# Patient Record
Sex: FEMALE | Race: WHITE | NOT HISPANIC OR LATINO | Employment: OTHER | URBAN - METROPOLITAN AREA
[De-identification: names, ages, dates, MRNs, and addresses within clinical notes are randomized per-mention and may not be internally consistent; named-entity substitution may affect disease eponyms.]

---

## 2018-10-14 ENCOUNTER — APPOINTMENT (OUTPATIENT)
Dept: CT IMAGING | Facility: HOSPITAL | Age: 53
End: 2018-10-14

## 2018-10-14 ENCOUNTER — HOSPITAL ENCOUNTER (EMERGENCY)
Facility: HOSPITAL | Age: 53
Discharge: HOME | End: 2018-10-14
Attending: EMERGENCY MEDICINE | Admitting: EMERGENCY MEDICINE

## 2018-10-14 VITALS
WEIGHT: 250 LBS | RESPIRATION RATE: 16 BRPM | HEART RATE: 98 BPM | HEIGHT: 68 IN | OXYGEN SATURATION: 99 % | BODY MASS INDEX: 37.89 KG/M2 | SYSTOLIC BLOOD PRESSURE: 98 MMHG | TEMPERATURE: 98.8 F | DIASTOLIC BLOOD PRESSURE: 54 MMHG

## 2018-10-14 DIAGNOSIS — R51.9 NONINTRACTABLE HEADACHE, UNSPECIFIED CHRONICITY PATTERN, UNSPECIFIED HEADACHE TYPE: Primary | ICD-10-CM

## 2018-10-14 DIAGNOSIS — R19.7 DIARRHEA, UNSPECIFIED TYPE: ICD-10-CM

## 2018-10-14 LAB
ALBUMIN SERPL-MCNC: 4.24 G/DL (ref 3.2–4.8)
ALBUMIN/GLOB SERPL: 2 G/DL (ref 1.5–2.5)
ALP SERPL-CCNC: 110 U/L (ref 25–100)
ALT SERPL W P-5'-P-CCNC: 16 U/L (ref 7–40)
ANION GAP SERPL CALCULATED.3IONS-SCNC: 6 MMOL/L (ref 3–11)
AST SERPL-CCNC: 22 U/L (ref 0–33)
BACTERIA UR QL AUTO: ABNORMAL /HPF
BASOPHILS # BLD AUTO: 0.01 10*3/MM3 (ref 0–0.2)
BASOPHILS NFR BLD AUTO: 0.2 % (ref 0–1)
BILIRUB SERPL-MCNC: 0.6 MG/DL (ref 0.3–1.2)
BILIRUB UR QL STRIP: NEGATIVE
BUN BLD-MCNC: 16 MG/DL (ref 9–23)
BUN/CREAT SERPL: 17.2 (ref 7–25)
CALCIUM SPEC-SCNC: 9 MG/DL (ref 8.7–10.4)
CHLORIDE SERPL-SCNC: 104 MMOL/L (ref 99–109)
CLARITY UR: ABNORMAL
CO2 SERPL-SCNC: 26 MMOL/L (ref 20–31)
COLOR UR: YELLOW
CREAT BLD-MCNC: 0.93 MG/DL (ref 0.6–1.3)
D-LACTATE SERPL-SCNC: 1.2 MMOL/L (ref 0.5–2)
DEPRECATED RDW RBC AUTO: 40.1 FL (ref 37–54)
EOSINOPHIL # BLD AUTO: 0.03 10*3/MM3 (ref 0–0.3)
EOSINOPHIL NFR BLD AUTO: 0.5 % (ref 0–3)
ERYTHROCYTE [DISTWIDTH] IN BLOOD BY AUTOMATED COUNT: 13.3 % (ref 11.3–14.5)
FLUAV AG NPH QL: NEGATIVE
FLUBV AG NPH QL IA: NEGATIVE
GFR SERPL CREATININE-BSD FRML MDRD: 63 ML/MIN/1.73
GLOBULIN UR ELPH-MCNC: 2.2 GM/DL
GLUCOSE BLD-MCNC: 134 MG/DL (ref 70–100)
GLUCOSE UR STRIP-MCNC: NEGATIVE MG/DL
HCT VFR BLD AUTO: 41.5 % (ref 34.5–44)
HGB BLD-MCNC: 13.7 G/DL (ref 11.5–15.5)
HGB UR QL STRIP.AUTO: NEGATIVE
HOLD SPECIMEN: NORMAL
HOLD SPECIMEN: NORMAL
HYALINE CASTS UR QL AUTO: ABNORMAL /LPF
IMM GRANULOCYTES # BLD: 0.04 10*3/MM3 (ref 0–0.03)
IMM GRANULOCYTES NFR BLD: 0.6 % (ref 0–0.6)
KETONES UR QL STRIP: ABNORMAL
LEUKOCYTE ESTERASE UR QL STRIP.AUTO: NEGATIVE
LIPASE SERPL-CCNC: 22 U/L (ref 6–51)
LYMPHOCYTES # BLD AUTO: 0.49 10*3/MM3 (ref 0.6–4.8)
LYMPHOCYTES NFR BLD AUTO: 7.6 % (ref 24–44)
MCH RBC QN AUTO: 27.7 PG (ref 27–31)
MCHC RBC AUTO-ENTMCNC: 33 G/DL (ref 32–36)
MCV RBC AUTO: 83.8 FL (ref 80–99)
MONOCYTES # BLD AUTO: 0.28 10*3/MM3 (ref 0–1)
MONOCYTES NFR BLD AUTO: 4.3 % (ref 0–12)
NEUTROPHILS # BLD AUTO: 5.68 10*3/MM3 (ref 1.5–8.3)
NEUTROPHILS NFR BLD AUTO: 87.4 % (ref 41–71)
NITRITE UR QL STRIP: NEGATIVE
PH UR STRIP.AUTO: 5.5 [PH] (ref 5–8)
PLATELET # BLD AUTO: 200 10*3/MM3 (ref 150–450)
PMV BLD AUTO: 10.3 FL (ref 6–12)
POTASSIUM BLD-SCNC: 3 MMOL/L (ref 3.5–5.5)
PROT SERPL-MCNC: 6.4 G/DL (ref 5.7–8.2)
PROT UR QL STRIP: ABNORMAL
RBC # BLD AUTO: 4.95 10*6/MM3 (ref 3.89–5.14)
RBC # UR: ABNORMAL /HPF
REF LAB TEST METHOD: ABNORMAL
S PYO AG THROAT QL: NEGATIVE
SODIUM BLD-SCNC: 136 MMOL/L (ref 132–146)
SP GR UR STRIP: 1.03 (ref 1–1.03)
SQUAMOUS #/AREA URNS HPF: ABNORMAL /HPF
UROBILINOGEN UR QL STRIP: ABNORMAL
WBC NRBC COR # BLD: 6.49 10*3/MM3 (ref 3.5–10.8)
WBC UR QL AUTO: ABNORMAL /HPF
WHOLE BLOOD HOLD SPECIMEN: NORMAL
WHOLE BLOOD HOLD SPECIMEN: NORMAL

## 2018-10-14 PROCEDURE — 80053 COMPREHEN METABOLIC PANEL: CPT | Performed by: EMERGENCY MEDICINE

## 2018-10-14 PROCEDURE — 87081 CULTURE SCREEN ONLY: CPT | Performed by: EMERGENCY MEDICINE

## 2018-10-14 PROCEDURE — 83605 ASSAY OF LACTIC ACID: CPT | Performed by: EMERGENCY MEDICINE

## 2018-10-14 PROCEDURE — 25010000002 ONDANSETRON PER 1 MG: Performed by: EMERGENCY MEDICINE

## 2018-10-14 PROCEDURE — 85025 COMPLETE CBC W/AUTO DIFF WBC: CPT | Performed by: EMERGENCY MEDICINE

## 2018-10-14 PROCEDURE — 99285 EMERGENCY DEPT VISIT HI MDM: CPT

## 2018-10-14 PROCEDURE — 87804 INFLUENZA ASSAY W/OPTIC: CPT | Performed by: EMERGENCY MEDICINE

## 2018-10-14 PROCEDURE — 70450 CT HEAD/BRAIN W/O DYE: CPT

## 2018-10-14 PROCEDURE — 96375 TX/PRO/DX INJ NEW DRUG ADDON: CPT

## 2018-10-14 PROCEDURE — 25010000002 KETOROLAC TROMETHAMINE PER 15 MG: Performed by: EMERGENCY MEDICINE

## 2018-10-14 PROCEDURE — 25010000002 METOCLOPRAMIDE PER 10 MG: Performed by: EMERGENCY MEDICINE

## 2018-10-14 PROCEDURE — 87040 BLOOD CULTURE FOR BACTERIA: CPT | Performed by: EMERGENCY MEDICINE

## 2018-10-14 PROCEDURE — 96365 THER/PROPH/DIAG IV INF INIT: CPT

## 2018-10-14 PROCEDURE — 25010000002 DIPHENHYDRAMINE PER 50 MG: Performed by: EMERGENCY MEDICINE

## 2018-10-14 PROCEDURE — 87147 CULTURE TYPE IMMUNOLOGIC: CPT | Performed by: EMERGENCY MEDICINE

## 2018-10-14 PROCEDURE — 87880 STREP A ASSAY W/OPTIC: CPT | Performed by: EMERGENCY MEDICINE

## 2018-10-14 PROCEDURE — 81001 URINALYSIS AUTO W/SCOPE: CPT | Performed by: EMERGENCY MEDICINE

## 2018-10-14 PROCEDURE — 25010000002 DEXAMETHASONE: Performed by: EMERGENCY MEDICINE

## 2018-10-14 PROCEDURE — 83690 ASSAY OF LIPASE: CPT | Performed by: EMERGENCY MEDICINE

## 2018-10-14 PROCEDURE — 96361 HYDRATE IV INFUSION ADD-ON: CPT

## 2018-10-14 RX ORDER — NIFEDIPINE 10 MG/1
CAPSULE ORAL DAILY
COMMUNITY

## 2018-10-14 RX ORDER — PREGABALIN 100 MG/1
100 CAPSULE ORAL 3 TIMES DAILY
COMMUNITY

## 2018-10-14 RX ORDER — KETOROLAC TROMETHAMINE 15 MG/ML
15 INJECTION, SOLUTION INTRAMUSCULAR; INTRAVENOUS ONCE
Status: COMPLETED | OUTPATIENT
Start: 2018-10-14 | End: 2018-10-14

## 2018-10-14 RX ORDER — ONDANSETRON 8 MG/1
8 TABLET, ORALLY DISINTEGRATING ORAL EVERY 8 HOURS PRN
Qty: 12 TABLET | Refills: 0 | Status: SHIPPED | OUTPATIENT
Start: 2018-10-14

## 2018-10-14 RX ORDER — HYDROCHLOROTHIAZIDE 12.5 MG/1
CAPSULE, GELATIN COATED ORAL DAILY
COMMUNITY

## 2018-10-14 RX ORDER — LEVOTHYROXINE SODIUM 0.15 MG/1
150 TABLET ORAL DAILY
COMMUNITY
End: 2021-10-12

## 2018-10-14 RX ORDER — DULOXETIN HYDROCHLORIDE 60 MG/1
100 CAPSULE, DELAYED RELEASE ORAL DAILY
COMMUNITY

## 2018-10-14 RX ORDER — DIPHENHYDRAMINE HYDROCHLORIDE 50 MG/ML
25 INJECTION INTRAMUSCULAR; INTRAVENOUS ONCE
Status: COMPLETED | OUTPATIENT
Start: 2018-10-14 | End: 2018-10-14

## 2018-10-14 RX ORDER — ONDANSETRON 2 MG/ML
4 INJECTION INTRAMUSCULAR; INTRAVENOUS ONCE
Status: COMPLETED | OUTPATIENT
Start: 2018-10-14 | End: 2018-10-14

## 2018-10-14 RX ORDER — METOCLOPRAMIDE HYDROCHLORIDE 5 MG/ML
10 INJECTION INTRAMUSCULAR; INTRAVENOUS ONCE
Status: DISCONTINUED | OUTPATIENT
Start: 2018-10-14 | End: 2018-10-14 | Stop reason: SDUPTHER

## 2018-10-14 RX ORDER — SODIUM CHLORIDE 0.9 % (FLUSH) 0.9 %
10 SYRINGE (ML) INJECTION AS NEEDED
Status: DISCONTINUED | OUTPATIENT
Start: 2018-10-14 | End: 2018-10-14 | Stop reason: HOSPADM

## 2018-10-14 RX ADMIN — ONDANSETRON HYDROCHLORIDE 4 MG: 2 INJECTION, SOLUTION INTRAMUSCULAR; INTRAVENOUS at 16:13

## 2018-10-14 RX ADMIN — SODIUM CHLORIDE 1000 ML: 9 INJECTION, SOLUTION INTRAVENOUS at 19:44

## 2018-10-14 RX ADMIN — SODIUM CHLORIDE 1000 ML: 9 INJECTION, SOLUTION INTRAVENOUS at 16:11

## 2018-10-14 RX ADMIN — KETOROLAC TROMETHAMINE 15 MG: 15 INJECTION, SOLUTION INTRAMUSCULAR; INTRAVENOUS at 18:53

## 2018-10-14 RX ADMIN — DIPHENHYDRAMINE HYDROCHLORIDE 25 MG: 50 INJECTION, SOLUTION INTRAMUSCULAR; INTRAVENOUS at 16:13

## 2018-10-14 RX ADMIN — METOCLOPRAMIDE: 5 INJECTION, SOLUTION INTRAMUSCULAR; INTRAVENOUS at 17:26

## 2018-10-14 RX ADMIN — DEXAMETHASONE SODIUM PHOSPHATE 10 MG: 10 INJECTION INTRAMUSCULAR; INTRAVENOUS at 16:42

## 2018-10-14 NOTE — ED PROVIDER NOTES
Subjective   Amanda Tejeda is a 53 y.o.female who presents to the ED with complaints of a headache. The patient reports her headache developed last night around  and has been worsening since onset. She states her headache is located in the occipital region of her head. She currently rates her discomfort as an 8/10. She has not taken any medication. She also complains of nausea, vomiting, diarrhea, fever, abdominal pain, and chills. She denies any recent use of antibiotics or travel. Additionally, she denies any history of DVT. There are no other complaints at this time.         History provided by:  Patient  Headache   Pain location:  Occipital  Quality:  Sharp  Radiates to:  Does not radiate  Severity currently:  810  Onset quality:  Sudden  Duration:  1 day  Timing:  Constant  Progression:  Worsening  Chronicity:  New  Similar to prior headaches: no    Relieved by:  None tried  Worsened by:  Nothing  Ineffective treatments:  None tried  Associated symptoms: abdominal pain, diarrhea, fever, nausea and vomiting        Review of Systems   Constitutional: Positive for chills and fever.   Gastrointestinal: Positive for abdominal pain, diarrhea, nausea and vomiting.   Neurological: Positive for headaches.   All other systems reviewed and are negative.      History reviewed. No pertinent past medical history.    Allergies   Allergen Reactions   • Codeine Nausea And Vomiting   • Penicillins Rash       Past Surgical History:   Procedure Laterality Date   •  SECTION     • HERNIA REPAIR     • HYSTERECTOMY     • NECK SURGERY         History reviewed. No pertinent family history.    Social History     Social History   • Marital status:      Social History Main Topics   • Smoking status: Never Smoker   • Smokeless tobacco: Never Used   • Alcohol use Yes      Comment: social   • Drug use: No   • Sexual activity: Defer     Other Topics Concern   • Not on file         Objective   Physical Exam   Constitutional:  She is oriented to person, place, and time. She appears well-developed and well-nourished. No distress.   HENT:   Head: Normocephalic and atraumatic.   Nose: Nose normal.   Mouth/Throat: Mucous membranes are dry.   Eyes: Conjunctivae are normal. No scleral icterus.   Neck: Normal range of motion. Neck supple.   Negative Brudzinski.  Negative jolt accentuation test.   Cardiovascular: Normal rate, regular rhythm and normal heart sounds.    No murmur heard.  Pulmonary/Chest: Effort normal and breath sounds normal. No respiratory distress.   Abdominal: Soft. Bowel sounds are normal. There is tenderness. There is no rebound and no guarding.   Epigastric tenderness.    Musculoskeletal: Normal range of motion. She exhibits no edema.   Neurological: She is alert and oriented to person, place, and time.   Skin: Skin is warm and dry.   Psychiatric: She has a normal mood and affect. Her behavior is normal.   Nursing note and vitals reviewed.      Procedures         ED Course  ED Course as of Oct 14 2037   Sun Oct 14, 2018   2034 Patient feels much better.  She states that her headache is completely gone but the medications we gave.  Patient has no other complaints or concerns.  [JI]      ED Course User Index  [JI] Dickson Prado PA     Recent Results (from the past 24 hour(s))   Lipase    Collection Time: 10/14/18  4:08 PM   Result Value Ref Range    Lipase 22 6 - 51 U/L   Light Blue Top    Collection Time: 10/14/18  4:08 PM   Result Value Ref Range    Extra Tube hold for add-on    Green Top (Gel)    Collection Time: 10/14/18  4:08 PM   Result Value Ref Range    Extra Tube Hold for add-ons.    Comprehensive Metabolic Panel    Collection Time: 10/14/18  4:08 PM   Result Value Ref Range    Glucose 134 (H) 70 - 100 mg/dL    BUN 16 9 - 23 mg/dL    Creatinine 0.93 0.60 - 1.30 mg/dL    Sodium 136 132 - 146 mmol/L    Potassium 3.0 (L) 3.5 - 5.5 mmol/L    Chloride 104 99 - 109 mmol/L    CO2 26.0 20.0 - 31.0 mmol/L    Calcium 9.0 8.7 -  10.4 mg/dL    Total Protein 6.4 5.7 - 8.2 g/dL    Albumin 4.24 3.20 - 4.80 g/dL    ALT (SGPT) 16 7 - 40 U/L    AST (SGOT) 22 0 - 33 U/L    Alkaline Phosphatase 110 (H) 25 - 100 U/L    Total Bilirubin 0.6 0.3 - 1.2 mg/dL    eGFR Non African Amer 63 >60 mL/min/1.73    Globulin 2.2 gm/dL    A/G Ratio 2.0 1.5 - 2.5 g/dL    BUN/Creatinine Ratio 17.2 7.0 - 25.0    Anion Gap 6.0 3.0 - 11.0 mmol/L   Lactic Acid, Plasma    Collection Time: 10/14/18  4:08 PM   Result Value Ref Range    Lactate 1.2 0.5 - 2.0 mmol/L   Gold Top - SST    Collection Time: 10/14/18  4:09 PM   Result Value Ref Range    Extra Tube Hold for add-ons.    Influenza Antigen, Rapid - Swab, Nasopharynx    Collection Time: 10/14/18  4:19 PM   Result Value Ref Range    Influenza A Ag, EIA Negative Negative    Influenza B Ag, EIA Negative Negative   Rapid Strep A Screen - Swab, Throat    Collection Time: 10/14/18  4:19 PM   Result Value Ref Range    Strep A Ag Negative Negative   Lavender Top    Collection Time: 10/14/18  4:31 PM   Result Value Ref Range    Extra Tube hold for add-on    CBC Auto Differential    Collection Time: 10/14/18  4:31 PM   Result Value Ref Range    WBC 6.49 3.50 - 10.80 10*3/mm3    RBC 4.95 3.89 - 5.14 10*6/mm3    Hemoglobin 13.7 11.5 - 15.5 g/dL    Hematocrit 41.5 34.5 - 44.0 %    MCV 83.8 80.0 - 99.0 fL    MCH 27.7 27.0 - 31.0 pg    MCHC 33.0 32.0 - 36.0 g/dL    RDW 13.3 11.3 - 14.5 %    RDW-SD 40.1 37.0 - 54.0 fl    MPV 10.3 6.0 - 12.0 fL    Platelets 200 150 - 450 10*3/mm3    Neutrophil % 87.4 (H) 41.0 - 71.0 %    Lymphocyte % 7.6 (L) 24.0 - 44.0 %    Monocyte % 4.3 0.0 - 12.0 %    Eosinophil % 0.5 0.0 - 3.0 %    Basophil % 0.2 0.0 - 1.0 %    Immature Grans % 0.6 0.0 - 0.6 %    Neutrophils, Absolute 5.68 1.50 - 8.30 10*3/mm3    Lymphocytes, Absolute 0.49 (L) 0.60 - 4.80 10*3/mm3    Monocytes, Absolute 0.28 0.00 - 1.00 10*3/mm3    Eosinophils, Absolute 0.03 0.00 - 0.30 10*3/mm3    Basophils, Absolute 0.01 0.00 - 0.20 10*3/mm3     Immature Grans, Absolute 0.04 (H) 0.00 - 0.03 10*3/mm3   Urinalysis With Microscopic If Indicated (No Culture) - Urine, Clean Catch    Collection Time: 10/14/18  7:02 PM   Result Value Ref Range    Color, UA Yellow Yellow, Straw    Appearance, UA Cloudy (A) Clear    pH, UA 5.5 5.0 - 8.0    Specific Gravity, UA 1.028 1.001 - 1.030    Glucose, UA Negative Negative    Ketones, UA Trace (A) Negative    Bilirubin, UA Negative Negative    Blood, UA Negative Negative    Protein, UA Trace (A) Negative    Leuk Esterase, UA Negative Negative    Nitrite, UA Negative Negative    Urobilinogen, UA 1.0 E.U./dL 0.2 - 1.0 E.U./dL   Urinalysis, Microscopic Only - Urine, Clean Catch    Collection Time: 10/14/18  7:02 PM   Result Value Ref Range    RBC, UA 0-2 None Seen, 0-2 /HPF    WBC, UA 0-2 None Seen, 0-2 /HPF    Bacteria, UA 1+ (A) None Seen, Trace /HPF    Squamous Epithelial Cells, UA 3-6 (A) None Seen, 0-2 /HPF    Hyaline Casts, UA 0-6 0 - 6 /LPF    Methodology Automated Microscopy      Note: In addition to lab results from this visit, the labs listed above may include labs taken at another facility or during a different encounter within the last 24 hours. Please correlate lab times with ED admission and discharge times for further clarification of the services performed during this visit.    CT Head Without Contrast   Preliminary Result   Mild chronic paranasal sinusitis with no acute intracranial   abnormality.       DICTATED:   10/14/2018   EDITED/ls :   10/14/2018             Vitals:    10/14/18 1833 10/14/18 1938 10/14/18 2025 10/14/18 2030   BP:  102/55 98/54    BP Location:       Patient Position:       Pulse:   98    Resp:    16   Temp:    98.8 °F (37.1 °C)   TempSrc:    Oral   SpO2: 94% 95% 99%    Weight:       Height:         Medications   sodium chloride 0.9 % flush 10 mL (not administered)   sodium chloride 0.9 % bolus 1,000 mL (1,000 mL Intravenous New Bag 10/14/18 1944)   sodium chloride 0.9 % bolus 1,000 mL (0 mL  Intravenous Stopped 10/14/18 1943)   ondansetron (ZOFRAN) injection 4 mg (4 mg Intravenous Given 10/14/18 1613)   diphenhydrAMINE (BENADRYL) injection 25 mg (25 mg Intravenous Given 10/14/18 1613)   dexamethasone (DECADRON) IVPB 10 mg (0 mg Intravenous Stopped 10/14/18 1732)   metoclopramide (REGLAN) 10 mg in sodium chloride 0.9 % 1,000 mL IVPB ( Intravenous Stopped 10/14/18 1943)   ketorolac (TORADOL) injection 15 mg (15 mg Intravenous Given 10/14/18 1853)     ECG/EMG Results (last 24 hours)     ** No results found for the last 24 hours. **                        MDM    Final diagnoses:   Nonintractable headache, unspecified chronicity pattern, unspecified headache type   Diarrhea, unspecified type       Documentation assistance provided by cristina Nance.  Information recorded by the scribe was done at my direction and has been verified and validated by me.     Deon Nance  10/14/18 4556       Dickson Prado PA  10/14/18 2038

## 2018-10-17 LAB
BACTERIA SPEC AEROBE CULT: ABNORMAL
STREP GROUPING: ABNORMAL

## 2018-10-19 ENCOUNTER — TELEPHONE (OUTPATIENT)
Dept: EMERGENCY DEPT | Facility: HOSPITAL | Age: 53
End: 2018-10-19

## 2018-10-19 LAB
BACTERIA SPEC AEROBE CULT: NORMAL
BACTERIA SPEC AEROBE CULT: NORMAL

## 2018-10-27 ENCOUNTER — TELEPHONE (OUTPATIENT)
Dept: EMERGENCY DEPT | Facility: HOSPITAL | Age: 53
End: 2018-10-27

## 2021-08-03 ENCOUNTER — OFFICE VISIT (OUTPATIENT)
Dept: ORTHOPEDIC SURGERY | Facility: CLINIC | Age: 56
End: 2021-08-03

## 2021-08-03 VITALS
SYSTOLIC BLOOD PRESSURE: 154 MMHG | DIASTOLIC BLOOD PRESSURE: 89 MMHG | HEIGHT: 68 IN | HEART RATE: 68 BPM | BODY MASS INDEX: 34.1 KG/M2 | WEIGHT: 225 LBS

## 2021-08-03 DIAGNOSIS — M16.11 PRIMARY OSTEOARTHRITIS OF RIGHT HIP: Primary | ICD-10-CM

## 2021-08-03 DIAGNOSIS — M25.551 RIGHT HIP PAIN: ICD-10-CM

## 2021-08-03 DIAGNOSIS — M47.816 LUMBAR SPONDYLOSIS: ICD-10-CM

## 2021-08-03 PROCEDURE — 99244 OFF/OP CNSLTJ NEW/EST MOD 40: CPT | Performed by: ORTHOPAEDIC SURGERY

## 2021-08-03 RX ORDER — ESTRADIOL 0.07 MG/D
FILM, EXTENDED RELEASE TRANSDERMAL
COMMUNITY
Start: 2021-07-19

## 2021-08-03 RX ORDER — CELECOXIB 200 MG/1
200 CAPSULE ORAL DAILY
COMMUNITY
Start: 2021-06-19

## 2021-08-03 RX ORDER — DONEPEZIL HYDROCHLORIDE 10 MG/1
10 TABLET, FILM COATED ORAL
COMMUNITY
Start: 2021-06-28

## 2021-08-03 RX ORDER — DILTIAZEM HYDROCHLORIDE 120 MG/1
120 CAPSULE, COATED, EXTENDED RELEASE ORAL DAILY
COMMUNITY
Start: 2021-05-28

## 2021-08-03 RX ORDER — PAROXETINE HYDROCHLORIDE 20 MG/1
20 TABLET, FILM COATED ORAL DAILY
COMMUNITY
Start: 2021-06-28

## 2021-08-03 RX ORDER — BUSPIRONE HYDROCHLORIDE 10 MG/1
7.5 TABLET ORAL 3 TIMES DAILY
COMMUNITY
Start: 2021-08-02

## 2021-08-03 RX ORDER — DIFLORASONE DIACETATE 0.5 MG/G
OINTMENT TOPICAL
COMMUNITY
Start: 2021-07-09

## 2021-08-03 RX ORDER — LEVOTHYROXINE SODIUM 137 UG/1
137 TABLET ORAL DAILY
COMMUNITY
Start: 2021-06-04

## 2021-08-03 RX ORDER — TOPIRAMATE 25 MG/1
TABLET ORAL
COMMUNITY
Start: 2021-06-30

## 2021-08-03 NOTE — PROGRESS NOTES
Orthopaedic Clinic Note: Hip New Patient    Chief Complaint   Patient presents with   • Right Hip - Pain        HPI  Consult from: VETO Wang    Amanda Elmore is a 56 y.o. female who presents with right hip pain for 1 year(s). Onset atraumatic and gradual in nature. Pain is localized to groin and is radiating down leg and is a 5/10 on the pain scale.Pain is described as dull and aching. Associated symptoms include pain and grinding.  The pain is worse with walking, standing, climbing stairs, sleeping and working; sitting and elevating improve the pain. Previous treatments have included: NSAIDS, physical therapy and weight loss since symptom onset. Although some transient relief was reported with these interventions, these conservative measures have failed and symptoms have persisted. The patient is limited in daily activities and has had a significant decrease in quality of life as a result. She denies fevers, chills, or constitutional symptoms. Patient does have a history of ongoing back pain for which she has seen pain management and undergone epidural injections in the past.    I have reviewed the following portions of the patient's history:History of Present Illness    History reviewed. No pertinent past medical history.   Past Surgical History:   Procedure Laterality Date   •  SECTION     • HERNIA REPAIR     • HYSTERECTOMY     • NECK SURGERY        Family History   Problem Relation Age of Onset   • Arthritis Mother    • Gout Mother    • Arthritis Father    • Gout Father      Social History     Socioeconomic History   • Marital status:      Spouse name: Not on file   • Number of children: Not on file   • Years of education: Not on file   • Highest education level: Not on file   Tobacco Use   • Smoking status: Never Smoker   • Smokeless tobacco: Never Used   Substance and Sexual Activity   • Alcohol use: Not Currently     Comment: social   • Drug use: No   • Sexual activity: Defer       Current Outpatient Medications on File Prior to Visit   Medication Sig Dispense Refill   • busPIRone (BUSPAR) 10 MG tablet Take 7.5 mg by mouth 3 (Three) Times a Day.     • celecoxib (CeleBREX) 200 MG capsule Take 200 mg by mouth Daily.     • diflorasone (PSORCON) 0.05 % ointment      • dilTIAZem CD (CARDIZEM CD) 120 MG 24 hr capsule Take 120 mg by mouth Daily.     • donepezil (ARICEPT) 10 MG tablet Take 10 mg by mouth every night at bedtime.     • estradiol (MINIVELLE, VIVELLE-DOT) 0.075 MG/24HR patch APPLY ONE PATCH TWICE A WEEK     • levothyroxine (SYNTHROID, LEVOTHROID) 137 MCG tablet Take 137 mcg by mouth Daily.     • PARoxetine (PAXIL) 20 MG tablet Take 20 mg by mouth Daily.     • pregabalin (LYRICA) 100 MG capsule Take 100 mg by mouth 3 (Three) Times a Day.     • topiramate (TOPAMAX) 25 MG tablet TAKE 1 TABLET BY MOUTH TWICE DAILY AS NEEDED AS TOLERATED     • DULoxetine (CYMBALTA) 60 MG capsule Take 100 mg by mouth Daily.     • hydrochlorothiazide (MICROZIDE) 12.5 MG capsule Take  by mouth Daily.     • levothyroxine (SYNTHROID, LEVOTHROID) 150 MCG tablet Take 150 mcg by mouth Daily.     • NIFEdipine (PROCARDIA) 10 MG capsule Take  by mouth Daily.     • ondansetron ODT (ZOFRAN-ODT) 8 MG disintegrating tablet Take 1 tablet by mouth Every 8 (Eight) Hours As Needed for Nausea or Vomiting. 12 tablet 0     No current facility-administered medications on file prior to visit.      Allergies   Allergen Reactions   • Codeine Nausea And Vomiting   • Drug Ingredient [Meloxicam] Other (See Comments)     shaking   • Flexeril [Cyclobenzaprine] Other (See Comments)     shaking   • Penicillins Rash        Review of Systems   Constitutional: Positive for activity change and fatigue.   HENT: Positive for postnasal drip, sinus pressure and tinnitus.    Eyes: Negative.    Respiratory: Negative.    Cardiovascular: Negative.    Gastrointestinal: Positive for diarrhea.   Endocrine: Positive for cold intolerance.   Genitourinary:  "Positive for flank pain and urgency.   Musculoskeletal: Positive for arthralgias, back pain, gait problem, joint swelling, myalgias, neck pain and neck stiffness.   Skin: Negative.    Allergic/Immunologic: Positive for environmental allergies.   Hematological: Negative.    Psychiatric/Behavioral: Positive for confusion, dysphoric mood and sleep disturbance. The patient is nervous/anxious.         The patient's Review of Systems was personally reviewed and confirmed as accurate.    The following portions of the patient's history were reviewed and updated as appropriate: allergies, current medications, past family history, past medical history, past social history, past surgical history and problem list.    Physical Exam  Blood pressure 154/89, pulse 68, height 172.7 cm (67.99\"), weight 102 kg (225 lb).    Body mass index is 34.22 kg/m².    GENERAL APPEARANCE: awake, alert & oriented x 3, in no acute distress and well developed, well nourished  PSYCH: normal affect  LUNGS:  breathing nonlabored  EYES: sclera anicteric  CARDIOVASCULAR: palpable dorsalis pedis, palpable posterior tibial bilaterally. Capillary refill less than 2 seconds  EXTREMITIES: no clubbing, cyanosis  GAIT:  Normal           Right Hip Exam:  RANGE OF MOTION:   FLEXION CONTRACTURE: None   FLEXION: 110 degrees   INTERNAL ROTATION: 20 degrees at 90 degrees of flexion   EXTERNAL ROTATION: 40 degrees at 90 degrees of flexion    PAIN WITH HIP MOTION: no  PAIN WITH LOGROLL: Yes, pain in the groin with terminal internal rotation  STINCHFIELD TEST: Trace positive    KNEE EXAM: full knee ROM (0-120 degrees), stable to varus and valgus stress at terminal extension and 30 degrees flexion     STRENGTH:  5/5 hip adduction, abduction, flexion. 5/5 strength knee flexion, extension. 5/5 strength ankle dorsiflexion and plantarflexion.     GREATER TROCHANTER BURSAL PAIN:  no     REFLEXES:   PATELLAR 2+/4   ACHILLES 2+/4    CLONUS: negative  STRAIGHT LEG TEST:   " negative    SENSATION TO LIGHT TOUCH:  DEEP PERONEAL/SUPERFICIAL PERONEAL/SURAL/SAPHENOUS/TIBIAL:  intact    EDEMA:   no  ERYTHEMA:  no  WOUNDS/INCISIONS: no overlying skin problems.      Left Hip Exam:   RANGE OF MOTION:   FLEXION CONTRACTURE: None   FLEXION: 110 degrees   INTERNAL ROTATION: 20 degrees at 90 degrees of flexion   EXTERNAL ROTATION: 40 degrees at 90 degrees of flexion    PAIN WITH HIP MOTION: no  PAIN WITH LOGROLL: no  STINCHFIELD TEST: negative    KNEE EXAM: full knee ROM (0-120 degrees), stable to varus and valgus stress at terminal extension and 30 degrees flexion     STRENGTH:  5/5 hip adduction, abduction, flexion. 5/5 strength knee flexion, extension. 5/5 strength ankle dorsiflexion and plantarflexion.     GREATER TROCHANTER BURSAL PAIN:  no     REFLEXES:   PATELLAR 2+/4   ACHILLES 2+/4    CLONUS: negative  STRAIGHT LEG TEST:   negative    SENSATION TO LIGHT TOUCH:  DEEP PERONEAL/SUPERFICIAL PERONEAL/SURAL/SAPHENOUS/TIBIAL:  intact    EDEMA:   no  ERYTHEMA:  no  WOUNDS/INCISIONS: no overlying skin problems.      ------------------------------------------------------------------    LEG LENGTHS:  equal  _____________________________________________________  _____________________________________________________    RADIOGRAPHIC FINDINGS:   Indication: Right hip pain    Comparison: No prior xrays are available for comparison    AP pelvis, hip 2 views: Right: moderate joint space narrowing,  minimal osteophyte formation; Left: moderate joint space narrowing,  minimal osteophyte formation    Assessment/Plan:   Diagnosis Plan   1. Primary osteoarthritis of right hip  [place] Guide For Pain Meds Inj   2. Right hip pain  XR Hip With or Without Pelvis 2 - 3 View Right   3. Lumbar spondylosis       Patient has hip pain localized to the groin with hip motion. This is concerning for hip arthritis as a source of her ongoing pain. However her hip motion is well-preserved on exam today. Given her history of prior  lumbar spine issues and lumbar spondylosis as well as her pain in the groin, I recommend proceeding to an intra-articular cortisone injection the right hip as both a diagnostic and therapeutic measure to determine how much of her pain is localized to the hip joint itself. She is agreeable this plan. She will follow-up in 2 months for repeat assessment.    Dimitry Norris MD  08/03/21  11:06 EDT

## 2021-08-11 ENCOUNTER — HOSPITAL ENCOUNTER (OUTPATIENT)
Dept: GENERAL RADIOLOGY | Facility: HOSPITAL | Age: 56
Discharge: HOME | End: 2021-08-11
Admitting: ORTHOPAEDIC SURGERY

## 2021-08-11 DIAGNOSIS — M16.11 PRIMARY OSTEOARTHRITIS OF RIGHT HIP: ICD-10-CM

## 2021-08-11 PROCEDURE — 77002 NEEDLE LOCALIZATION BY XRAY: CPT

## 2021-08-11 PROCEDURE — 25010000002 IOPAMIDOL 61 % SOLUTION: Performed by: ORTHOPAEDIC SURGERY

## 2021-08-11 PROCEDURE — 25010000002 TRIAMCINOLONE PER 10 MG: Performed by: ORTHOPAEDIC SURGERY

## 2021-08-11 RX ORDER — LIDOCAINE HYDROCHLORIDE 10 MG/ML
5 INJECTION, SOLUTION EPIDURAL; INFILTRATION; INTRACAUDAL; PERINEURAL ONCE
Status: COMPLETED | OUTPATIENT
Start: 2021-08-11 | End: 2021-08-11

## 2021-08-11 RX ORDER — LIDOCAINE HYDROCHLORIDE 10 MG/ML
3 INJECTION, SOLUTION EPIDURAL; INFILTRATION; INTRACAUDAL; PERINEURAL ONCE
Status: COMPLETED | OUTPATIENT
Start: 2021-08-11 | End: 2021-08-11

## 2021-08-11 RX ORDER — TRIAMCINOLONE ACETONIDE 40 MG/ML
80 INJECTION, SUSPENSION INTRA-ARTICULAR; INTRAMUSCULAR ONCE
Status: COMPLETED | OUTPATIENT
Start: 2021-08-11 | End: 2021-08-11

## 2021-08-11 RX ORDER — BUPIVACAINE HYDROCHLORIDE 2.5 MG/ML
3 INJECTION, SOLUTION EPIDURAL; INFILTRATION; INTRACAUDAL ONCE
Status: COMPLETED | OUTPATIENT
Start: 2021-08-11 | End: 2021-08-11

## 2021-08-11 RX ADMIN — TRIAMCINOLONE ACETONIDE 80 MG: 40 INJECTION, SUSPENSION INTRA-ARTICULAR; INTRAMUSCULAR at 11:44

## 2021-08-11 RX ADMIN — LIDOCAINE HYDROCHLORIDE 5 ML: 10 INJECTION, SOLUTION EPIDURAL; INFILTRATION; INTRACAUDAL; PERINEURAL at 11:44

## 2021-08-11 RX ADMIN — BUPIVACAINE HYDROCHLORIDE 3 ML: 2.5 INJECTION, SOLUTION EPIDURAL; INFILTRATION; INTRACAUDAL; PERINEURAL at 11:43

## 2021-08-11 RX ADMIN — IOPAMIDOL 10 ML: 612 INJECTION, SOLUTION INTRAVENOUS at 11:44

## 2021-08-11 RX ADMIN — LIDOCAINE HYDROCHLORIDE 3 ML: 10 INJECTION, SOLUTION EPIDURAL; INFILTRATION; INTRACAUDAL; PERINEURAL at 11:44

## 2021-08-12 ENCOUNTER — TELEPHONE (OUTPATIENT)
Dept: ORTHOPEDIC SURGERY | Facility: CLINIC | Age: 56
End: 2021-08-12

## 2021-08-12 NOTE — TELEPHONE ENCOUNTER
Caller: BRODIE    Relationship: [facility]    Best call back number: [phone]    What form or medical record are you requesting: OFFICE NOTES 08.03.21    Who is requesting this form or medical record from you: REFERRING PROVIDER    How would you like to receive the form or medical records (pick-up, mail, fax): FAX  If fax, what is the fax number: [phone]    Timeframe paperwork needed: WHENEVER POSSIBLE    Additional notes: N/A

## 2021-10-12 ENCOUNTER — OFFICE VISIT (OUTPATIENT)
Dept: ORTHOPEDIC SURGERY | Facility: CLINIC | Age: 56
End: 2021-10-12

## 2021-10-12 VITALS
HEART RATE: 56 BPM | BODY MASS INDEX: 34.89 KG/M2 | WEIGHT: 230.2 LBS | DIASTOLIC BLOOD PRESSURE: 83 MMHG | HEIGHT: 68 IN | SYSTOLIC BLOOD PRESSURE: 127 MMHG

## 2021-10-12 DIAGNOSIS — M16.11 PRIMARY OSTEOARTHRITIS OF RIGHT HIP: Primary | ICD-10-CM

## 2021-10-12 DIAGNOSIS — M47.816 LUMBAR SPONDYLOSIS: ICD-10-CM

## 2021-10-12 PROCEDURE — 99212 OFFICE O/P EST SF 10 MIN: CPT | Performed by: ORTHOPAEDIC SURGERY

## 2021-10-12 RX ORDER — ROSUVASTATIN CALCIUM 40 MG/1
40 TABLET, COATED ORAL
COMMUNITY
Start: 2021-09-08

## 2021-10-12 RX ORDER — FLUCONAZOLE 150 MG/1
TABLET ORAL
COMMUNITY
Start: 2021-10-06

## 2021-10-12 RX ORDER — DOXYCYCLINE HYCLATE 100 MG/1
CAPSULE ORAL
COMMUNITY
Start: 2021-10-09

## 2021-10-12 RX ORDER — CHOLESTYRAMINE LIGHT 4 G/5.7G
POWDER, FOR SUSPENSION ORAL
COMMUNITY
Start: 2021-08-04

## 2021-10-12 NOTE — PROGRESS NOTES
Orthopaedic Clinic Note: Hip Established Patient    Chief Complaint   Patient presents with   • Follow-up     2 month follow up; Primary osteoarthritis of right hip-last intra-articular hip injection given on 21        HPI    It has been 2  month(s) since Ms. Elmore's last visit. She returns to clinic today for follow-up right hip arthritis.  She underwent intra-articular injection on 2021.  She states the injection only provided about 50% pain relief.  She is continuing to have pain in her back and radiating down her legs.  She rates it a 3/10 on the pain scale.  She is continue with physical therapy and overall feels like she is somewhat improved.    History reviewed. No pertinent past medical history.   Past Surgical History:   Procedure Laterality Date   •  SECTION     • HERNIA REPAIR     • HYSTERECTOMY     • NECK SURGERY        Family History   Problem Relation Age of Onset   • Arthritis Mother    • Gout Mother    • Arthritis Father    • Gout Father      Social History     Socioeconomic History   • Marital status:    Tobacco Use   • Smoking status: Never Smoker   • Smokeless tobacco: Never Used   Substance and Sexual Activity   • Alcohol use: Not Currently     Comment: social   • Drug use: No   • Sexual activity: Defer      Current Outpatient Medications on File Prior to Visit   Medication Sig Dispense Refill   • busPIRone (BUSPAR) 10 MG tablet Take 7.5 mg by mouth 3 (Three) Times a Day.     • celecoxib (CeleBREX) 200 MG capsule Take 200 mg by mouth Daily.     • cholestyramine light (PREVALITE) 4 GM/DOSE powder DISSOLVE 1 SCOOPFUL IN WATER AND DRINK AT BEDTIME     • diflorasone (PSORCON) 0.05 % ointment      • dilTIAZem CD (CARDIZEM CD) 120 MG 24 hr capsule Take 120 mg by mouth Daily.     • donepezil (ARICEPT) 10 MG tablet Take 10 mg by mouth every night at bedtime.     • doxycycline (VIBRAMYCIN) 100 MG capsule      • DULoxetine (CYMBALTA) 60 MG capsule Take 100 mg by mouth Daily.    "  • estradiol (MINIVELLE, VIVELLE-DOT) 0.075 MG/24HR patch APPLY ONE PATCH TWICE A WEEK     • fluconazole (DIFLUCAN) 150 MG tablet TAKE 1 TABLET BY MOUTH NOW. REPEAT AT END OF DOXY     • hydrochlorothiazide (MICROZIDE) 12.5 MG capsule Take  by mouth Daily.     • levothyroxine (SYNTHROID, LEVOTHROID) 137 MCG tablet Take 137 mcg by mouth Daily.     • NIFEdipine (PROCARDIA) 10 MG capsule Take  by mouth Daily.     • ondansetron ODT (ZOFRAN-ODT) 8 MG disintegrating tablet Take 1 tablet by mouth Every 8 (Eight) Hours As Needed for Nausea or Vomiting. 12 tablet 0   • PARoxetine (PAXIL) 20 MG tablet Take 20 mg by mouth Daily.     • pregabalin (LYRICA) 100 MG capsule Take 100 mg by mouth 3 (Three) Times a Day.     • rosuvastatin (CRESTOR) 40 MG tablet Take 40 mg by mouth every night at bedtime.     • topiramate (TOPAMAX) 25 MG tablet TAKE 1 TABLET BY MOUTH TWICE DAILY AS NEEDED AS TOLERATED     • [DISCONTINUED] levothyroxine (SYNTHROID, LEVOTHROID) 150 MCG tablet Take 150 mcg by mouth Daily.       No current facility-administered medications on file prior to visit.      Allergies   Allergen Reactions   • Codeine Nausea And Vomiting   • Drug Ingredient [Meloxicam] Other (See Comments)     shaking   • Flexeril [Cyclobenzaprine] Other (See Comments)     shaking   • Penicillins Rash        Review of Systems   Constitutional: Negative.    HENT: Negative.    Eyes: Negative.    Respiratory: Negative.    Cardiovascular: Negative.    Gastrointestinal: Negative.    Endocrine: Negative.    Genitourinary: Negative.    Musculoskeletal: Positive for arthralgias.   Skin: Negative.    Allergic/Immunologic: Negative.    Neurological: Negative.    Hematological: Negative.    Psychiatric/Behavioral: Negative.         The patient's Review of Systems was personally reviewed and confirmed as accurate.    Physical Exam  Blood pressure 127/83, pulse 56, height 172.7 cm (67.99\"), weight 104 kg (230 lb 3.2 oz).    Body mass index is 35.01 " kg/m².    GENERAL APPEARANCE: awake, alert, oriented, in no acute distress and well developed, well nourished  LUNGS:  breathing nonlabored  EXTREMITIES: no clubbing, cyanosis  PERIPHERAL PULSES: palpable dorsalis pedis and posterior tibial pulses bilaterally.    GAIT:  Normal            Hip Exam:  Bilateral    RANGE OF MOTION:  EXTENSION/FLEXION:  normal (0-110 degrees)  IR (at 90 degrees of flexion):  20  ER (at 90 degrees of flexion):  40  PAIN WITH HIP MOTION:  no  PAIN WITH LOGROLL:  no     STINCFIELD TEST: negative    STRENGTH:  ABDUCTOR:  5/5  ADDUCTOR:  5/5  HIP FLEXION:  5/5    GREATER TROCHANTER BURSAL PAIN:  no    SENSATION TO LIGHT TOUCH:  DEEP PERONEAL/SUPERFICIAL PERONEAL/SURAL/SAPHENOUS/TIBIAL:   intact    EDEMA:  no  ERYTHEMA:  no  WOUNDS/INCISIONS:   no  _________________________________________________________________  _________________________________________________________________    RADIOGRAPHIC FINDINGS:   No new imaging today.    Assessment/Plan:   Diagnosis Plan   1. Primary osteoarthritis of right hip     2. Lumbar spondylosis       Given the relatively benign clinical exam with preservation and asymptomatic hip range of motion in combination with only 50% pain relief with intra-articular injection, I do not believe she is ready for total joint arthroplasty at this time.  I am somewhat suspicious that her back is contributing to some of her ongoing pain.  I recommended continued therapy exercises for her back.  I will see her back on an as-needed basis.    Dimitry Norris MD  10/12/21  10:55 EDT

## 2021-12-06 ENCOUNTER — APPOINTMENT (OUTPATIENT)
Dept: MAMMOGRAPHY | Facility: HOSPITAL | Age: 56
End: 2021-12-06

## 2021-12-07 ENCOUNTER — HOSPITAL ENCOUNTER (OUTPATIENT)
Dept: MAMMOGRAPHY | Facility: HOSPITAL | Age: 56
Discharge: HOME | End: 2021-12-07
Admitting: OBSTETRICS & GYNECOLOGY

## 2021-12-07 DIAGNOSIS — Z12.31 VISIT FOR SCREENING MAMMOGRAM: ICD-10-CM

## 2021-12-07 PROCEDURE — 77067 SCR MAMMO BI INCL CAD: CPT | Performed by: RADIOLOGY

## 2021-12-07 PROCEDURE — 77063 BREAST TOMOSYNTHESIS BI: CPT

## 2021-12-07 PROCEDURE — 77067 SCR MAMMO BI INCL CAD: CPT

## 2021-12-07 PROCEDURE — 77063 BREAST TOMOSYNTHESIS BI: CPT | Performed by: RADIOLOGY
